# Patient Record
Sex: FEMALE | Race: WHITE | NOT HISPANIC OR LATINO | Employment: OTHER | RURAL
[De-identification: names, ages, dates, MRNs, and addresses within clinical notes are randomized per-mention and may not be internally consistent; named-entity substitution may affect disease eponyms.]

---

## 2020-12-21 ENCOUNTER — HISTORICAL (OUTPATIENT)
Dept: ADMINISTRATIVE | Facility: HOSPITAL | Age: 80
End: 2020-12-21

## 2021-07-20 PROCEDURE — 90853 GROUP PSYCHOTHERAPY: CPT | Mod: PO

## 2021-07-22 PROCEDURE — 90853 GROUP PSYCHOTHERAPY: CPT | Mod: PO

## 2021-07-27 PROCEDURE — 90853 GROUP PSYCHOTHERAPY: CPT | Mod: PO

## 2021-07-29 PROCEDURE — 90853 GROUP PSYCHOTHERAPY: CPT | Mod: PO

## 2021-08-03 PROCEDURE — 90853 GROUP PSYCHOTHERAPY: CPT | Mod: PO

## 2021-08-05 PROCEDURE — 90853 GROUP PSYCHOTHERAPY: CPT | Mod: PO

## 2021-08-10 PROCEDURE — 90853 GROUP PSYCHOTHERAPY: CPT | Mod: PO

## 2021-08-12 PROCEDURE — 90853 GROUP PSYCHOTHERAPY: CPT | Mod: PO

## 2021-08-17 PROCEDURE — 90853 GROUP PSYCHOTHERAPY: CPT | Mod: PO

## 2021-08-19 PROCEDURE — 90853 GROUP PSYCHOTHERAPY: CPT | Mod: PO

## 2021-08-19 PROCEDURE — 90832 PSYTX W PT 30 MINUTES: CPT | Mod: PO

## 2021-08-24 PROCEDURE — 90853 GROUP PSYCHOTHERAPY: CPT | Mod: PO

## 2021-08-26 PROCEDURE — 90853 GROUP PSYCHOTHERAPY: CPT | Mod: PO

## 2021-08-31 PROCEDURE — 90853 GROUP PSYCHOTHERAPY: CPT | Mod: PO

## 2021-09-07 PROCEDURE — 90853 GROUP PSYCHOTHERAPY: CPT | Mod: PO

## 2021-09-09 PROCEDURE — 90853 GROUP PSYCHOTHERAPY: CPT | Mod: PO

## 2021-09-14 PROCEDURE — 90853 GROUP PSYCHOTHERAPY: CPT | Mod: PO

## 2021-09-16 PROCEDURE — 90853 GROUP PSYCHOTHERAPY: CPT | Mod: PO

## 2021-09-21 PROCEDURE — 90832 PSYTX W PT 30 MINUTES: CPT | Mod: PO

## 2021-09-21 PROCEDURE — 90853 GROUP PSYCHOTHERAPY: CPT | Mod: PO

## 2021-09-22 PROCEDURE — 90853 GROUP PSYCHOTHERAPY: CPT | Mod: PO

## 2021-09-28 PROCEDURE — 90853 GROUP PSYCHOTHERAPY: CPT | Mod: PO

## 2021-09-30 PROCEDURE — 90853 GROUP PSYCHOTHERAPY: CPT | Mod: PO

## 2021-10-05 PROCEDURE — 90853 GROUP PSYCHOTHERAPY: CPT | Mod: PO

## 2021-10-07 PROCEDURE — 90853 GROUP PSYCHOTHERAPY: CPT | Mod: PO

## 2021-10-12 PROCEDURE — 90853 GROUP PSYCHOTHERAPY: CPT | Mod: PO

## 2021-10-14 PROCEDURE — 90853 GROUP PSYCHOTHERAPY: CPT | Mod: PO

## 2021-10-19 PROCEDURE — 90853 GROUP PSYCHOTHERAPY: CPT | Mod: PO

## 2021-10-21 PROCEDURE — 90853 GROUP PSYCHOTHERAPY: CPT | Mod: PO

## 2021-10-26 PROCEDURE — 90853 GROUP PSYCHOTHERAPY: CPT | Mod: PO

## 2021-10-28 PROCEDURE — 90853 GROUP PSYCHOTHERAPY: CPT | Mod: PO

## 2021-10-28 PROCEDURE — 90832 PSYTX W PT 30 MINUTES: CPT | Mod: PO,59

## 2021-11-02 PROCEDURE — 90853 GROUP PSYCHOTHERAPY: CPT | Mod: PO

## 2021-11-04 PROCEDURE — 90853 GROUP PSYCHOTHERAPY: CPT | Mod: PO

## 2021-11-09 PROCEDURE — 90853 GROUP PSYCHOTHERAPY: CPT | Mod: PO

## 2021-11-11 PROCEDURE — 90853 GROUP PSYCHOTHERAPY: CPT | Mod: PO

## 2021-11-16 PROCEDURE — 90853 GROUP PSYCHOTHERAPY: CPT | Mod: PO

## 2021-11-23 PROCEDURE — 90853 GROUP PSYCHOTHERAPY: CPT | Mod: PO

## 2021-11-24 PROCEDURE — 90853 GROUP PSYCHOTHERAPY: CPT | Mod: PO

## 2021-11-30 PROCEDURE — 90853 GROUP PSYCHOTHERAPY: CPT | Mod: PO

## 2021-12-02 PROCEDURE — 90853 GROUP PSYCHOTHERAPY: CPT | Mod: PO

## 2021-12-14 PROCEDURE — 90832 PSYTX W PT 30 MINUTES: CPT | Mod: PO

## 2021-12-14 PROCEDURE — 90853 GROUP PSYCHOTHERAPY: CPT | Mod: PO

## 2021-12-16 PROCEDURE — 90853 GROUP PSYCHOTHERAPY: CPT | Mod: PO

## 2021-12-20 PROCEDURE — 90853 GROUP PSYCHOTHERAPY: CPT | Mod: PO

## 2021-12-21 PROCEDURE — 90853 GROUP PSYCHOTHERAPY: CPT | Mod: PO

## 2021-12-27 PROCEDURE — 90853 GROUP PSYCHOTHERAPY: CPT | Mod: PO

## 2021-12-27 PROCEDURE — 90832 PSYTX W PT 30 MINUTES: CPT | Mod: PO

## 2021-12-28 PROCEDURE — 90853 GROUP PSYCHOTHERAPY: CPT | Mod: PO

## 2022-01-04 PROCEDURE — 90853 GROUP PSYCHOTHERAPY: CPT | Mod: PO

## 2022-01-10 ENCOUNTER — HOSPITAL ENCOUNTER (OUTPATIENT)
Dept: RADIOLOGY | Facility: HOSPITAL | Age: 82
Discharge: HOME | End: 2022-01-10
Payer: MEDICARE

## 2022-01-10 VITALS — WEIGHT: 160 LBS | BODY MASS INDEX: 28.35 KG/M2 | HEIGHT: 63 IN

## 2022-01-10 DIAGNOSIS — Z12.31 VISIT FOR SCREENING MAMMOGRAM: ICD-10-CM

## 2022-01-10 PROCEDURE — 77067 SCR MAMMO BI INCL CAD: CPT | Mod: TC

## 2022-01-10 PROCEDURE — 77067 SCR MAMMO BI INCL CAD: CPT | Mod: 26,,, | Performed by: RADIOLOGY

## 2022-01-10 PROCEDURE — 77067 MAMMO DIGITAL SCREENING BILAT: ICD-10-PCS | Mod: 26,,, | Performed by: RADIOLOGY

## 2022-01-14 PROCEDURE — 90853 GROUP PSYCHOTHERAPY: CPT | Mod: PO

## 2022-03-08 PROCEDURE — 90853 GROUP PSYCHOTHERAPY: CPT | Mod: PO

## 2022-03-10 PROCEDURE — 90853 GROUP PSYCHOTHERAPY: CPT | Mod: PO

## 2022-03-14 PROCEDURE — 90832 PSYTX W PT 30 MINUTES: CPT | Mod: PO

## 2022-03-14 PROCEDURE — 90853 GROUP PSYCHOTHERAPY: CPT | Mod: PO

## 2022-03-17 PROCEDURE — 90853 GROUP PSYCHOTHERAPY: CPT | Mod: PO

## 2022-03-21 PROCEDURE — 90853 GROUP PSYCHOTHERAPY: CPT | Mod: PO

## 2022-03-22 PROCEDURE — 90853 GROUP PSYCHOTHERAPY: CPT | Mod: PO

## 2022-03-24 PROCEDURE — 90853 GROUP PSYCHOTHERAPY: CPT | Mod: PO

## 2022-03-28 PROCEDURE — 90853 GROUP PSYCHOTHERAPY: CPT | Mod: PO

## 2022-03-29 PROCEDURE — 90853 GROUP PSYCHOTHERAPY: CPT | Mod: PO

## 2022-03-31 PROCEDURE — 90853 GROUP PSYCHOTHERAPY: CPT | Mod: PO

## 2022-04-04 PROCEDURE — 90853 GROUP PSYCHOTHERAPY: CPT | Mod: PO

## 2022-04-07 PROCEDURE — 90853 GROUP PSYCHOTHERAPY: CPT | Mod: PO

## 2022-04-08 PROCEDURE — 90853 GROUP PSYCHOTHERAPY: CPT | Mod: PO

## 2022-04-08 PROCEDURE — 90832 PSYTX W PT 30 MINUTES: CPT | Mod: PO

## 2022-04-11 PROCEDURE — 90853 GROUP PSYCHOTHERAPY: CPT | Mod: PO

## 2022-04-12 PROCEDURE — 90853 GROUP PSYCHOTHERAPY: CPT | Mod: PO

## 2022-04-14 PROCEDURE — 90853 GROUP PSYCHOTHERAPY: CPT | Mod: PO

## 2022-04-15 ENCOUNTER — OUTSIDE PLACE OF SERVICE (OUTPATIENT)
Dept: ADMINISTRATIVE | Facility: HOSPITAL | Age: 82
End: 2022-04-15
Payer: MEDICARE

## 2022-04-16 PROCEDURE — 99232 SBSQ HOSP IP/OBS MODERATE 35: CPT | Mod: 57,,, | Performed by: ORTHOPAEDIC SURGERY

## 2022-04-16 PROCEDURE — 99232 PR SUBSEQUENT HOSPITAL CARE,LEVL II: ICD-10-PCS | Mod: 57,,, | Performed by: ORTHOPAEDIC SURGERY

## 2022-04-16 PROCEDURE — 27245 PR OPEN FIX INTER/SUBTROCH FX,IMPLNT: ICD-10-PCS | Mod: RT,,, | Performed by: ORTHOPAEDIC SURGERY

## 2022-04-16 PROCEDURE — 27245 TREAT THIGH FRACTURE: CPT | Mod: RT,,, | Performed by: ORTHOPAEDIC SURGERY

## 2022-05-16 DIAGNOSIS — S72.142D CLOSED DISPLACED INTERTROCHANTERIC FRACTURE OF LEFT FEMUR WITH ROUTINE HEALING, SUBSEQUENT ENCOUNTER: Primary | ICD-10-CM

## 2023-01-26 ENCOUNTER — HOSPITAL ENCOUNTER (OUTPATIENT)
Dept: RADIOLOGY | Facility: HOSPITAL | Age: 83
Discharge: HOME | End: 2023-01-26
Attending: RADIOLOGY
Payer: MEDICARE

## 2023-01-26 DIAGNOSIS — Z12.31 VISIT FOR SCREENING MAMMOGRAM: ICD-10-CM

## 2023-01-26 PROCEDURE — 77067 SCR MAMMO BI INCL CAD: CPT | Mod: TC

## 2023-01-26 PROCEDURE — 77067 SCR MAMMO BI INCL CAD: CPT | Mod: 26,GW,ICN, | Performed by: RADIOLOGY

## 2023-01-26 PROCEDURE — 77067 MAMMO DIGITAL SCREENING BILAT: ICD-10-PCS | Mod: 26,GW,ICN, | Performed by: RADIOLOGY

## 2023-07-14 PROCEDURE — 99222 PR INITIAL HOSPITAL CARE,LEVL II: ICD-10-PCS | Mod: 57,,, | Performed by: ORTHOPAEDIC SURGERY

## 2023-07-14 PROCEDURE — 99222 1ST HOSP IP/OBS MODERATE 55: CPT | Mod: 57,,, | Performed by: ORTHOPAEDIC SURGERY

## 2023-07-15 ENCOUNTER — OUTSIDE PLACE OF SERVICE (OUTPATIENT)
Dept: ORTHOPEDICS | Facility: CLINIC | Age: 83
End: 2023-07-15
Payer: MEDICARE

## 2023-07-15 PROCEDURE — 26735 TREAT FINGER FRACTURE EACH: CPT | Mod: RT,,, | Performed by: ORTHOPAEDIC SURGERY

## 2023-07-15 PROCEDURE — 23605 PR CLOSED RX PROX HUMERUS FX,MANIP: ICD-10-PCS | Mod: 51,LT,, | Performed by: ORTHOPAEDIC SURGERY

## 2023-07-15 PROCEDURE — 26735 PR OPEN TX PHALANGEAL SHAFT FRACTURE PROX/MIDDLE EA: ICD-10-PCS | Mod: RT,,, | Performed by: ORTHOPAEDIC SURGERY

## 2023-07-15 PROCEDURE — 23605 CLTX PRX HMRL FX MNPJ+-TRACT: CPT | Mod: 51,LT,, | Performed by: ORTHOPAEDIC SURGERY

## 2023-07-21 DIAGNOSIS — M25.512 LEFT SHOULDER PAIN, UNSPECIFIED CHRONICITY: Primary | ICD-10-CM

## 2023-07-24 ENCOUNTER — OFFICE VISIT (OUTPATIENT)
Dept: ORTHOPEDICS | Facility: CLINIC | Age: 83
End: 2023-07-24
Payer: COMMERCIAL

## 2023-07-24 ENCOUNTER — HOSPITAL ENCOUNTER (OUTPATIENT)
Dept: RADIOLOGY | Facility: HOSPITAL | Age: 83
Discharge: HOME | End: 2023-07-24
Attending: ORTHOPAEDIC SURGERY
Payer: COMMERCIAL

## 2023-07-24 DIAGNOSIS — M25.512 LEFT SHOULDER PAIN, UNSPECIFIED CHRONICITY: ICD-10-CM

## 2023-07-24 DIAGNOSIS — Z47.89 ORTHOPEDIC AFTERCARE: Primary | ICD-10-CM

## 2023-07-24 DIAGNOSIS — Z47.89 ORTHOPEDIC AFTERCARE: ICD-10-CM

## 2023-07-24 PROCEDURE — 73130 X-RAY EXAM OF HAND: CPT | Mod: 26,RT,, | Performed by: ORTHOPAEDIC SURGERY

## 2023-07-24 PROCEDURE — 73110 XR WRIST COMPLETE 3 VIEWS RIGHT: ICD-10-PCS | Mod: 26,RT,, | Performed by: ORTHOPAEDIC SURGERY

## 2023-07-24 PROCEDURE — 99024 POSTOP FOLLOW-UP VISIT: CPT | Mod: ,,, | Performed by: ORTHOPAEDIC SURGERY

## 2023-07-24 PROCEDURE — 99213 OFFICE O/P EST LOW 20 MIN: CPT | Mod: PBBFAC | Performed by: ORTHOPAEDIC SURGERY

## 2023-07-24 PROCEDURE — 73110 X-RAY EXAM OF WRIST: CPT | Mod: TC,RT

## 2023-07-24 PROCEDURE — 73130 X-RAY EXAM OF HAND: CPT | Mod: TC,RT

## 2023-07-24 PROCEDURE — 73030 XR SHOULDER COMPLETE 2 OR MORE VIEWS LEFT: ICD-10-PCS | Mod: 26,LT,, | Performed by: ORTHOPAEDIC SURGERY

## 2023-07-24 PROCEDURE — 99024 PR POST-OP FOLLOW-UP VISIT: ICD-10-PCS | Mod: ,,, | Performed by: ORTHOPAEDIC SURGERY

## 2023-07-24 PROCEDURE — 73030 X-RAY EXAM OF SHOULDER: CPT | Mod: TC,LT

## 2023-07-24 PROCEDURE — 73110 X-RAY EXAM OF WRIST: CPT | Mod: 26,RT,, | Performed by: ORTHOPAEDIC SURGERY

## 2023-07-24 PROCEDURE — 73030 X-RAY EXAM OF SHOULDER: CPT | Mod: 26,LT,, | Performed by: ORTHOPAEDIC SURGERY

## 2023-07-24 PROCEDURE — 73130 XR HAND COMPLETE 3 VIEW RIGHT: ICD-10-PCS | Mod: 26,RT,, | Performed by: ORTHOPAEDIC SURGERY

## 2023-07-24 RX ORDER — PROPRANOLOL HYDROCHLORIDE 60 MG/1
60 CAPSULE, EXTENDED RELEASE ORAL NIGHTLY
COMMUNITY
Start: 2023-07-14

## 2023-07-24 RX ORDER — MEMANTINE HYDROCHLORIDE 10 MG/1
10 TABLET ORAL 2 TIMES DAILY
COMMUNITY
Start: 2023-07-14

## 2023-07-24 RX ORDER — METOPROLOL TARTRATE 50 MG/1
TABLET ORAL
COMMUNITY
Start: 2022-09-30

## 2023-07-24 RX ORDER — GLIPIZIDE 10 MG/1
TABLET ORAL
COMMUNITY
Start: 2022-11-04

## 2023-07-24 RX ORDER — AMITRIPTYLINE HYDROCHLORIDE 10 MG/1
10 TABLET, FILM COATED ORAL NIGHTLY
COMMUNITY
Start: 2023-07-07

## 2023-07-24 RX ORDER — MONTELUKAST SODIUM 10 MG/1
10 TABLET ORAL
COMMUNITY
Start: 2023-06-16

## 2023-07-24 RX ORDER — GABAPENTIN 100 MG/1
CAPSULE ORAL
COMMUNITY
Start: 2023-02-09

## 2023-07-24 RX ORDER — INSULIN DETEMIR 100 [IU]/ML
INJECTION, SOLUTION SUBCUTANEOUS
COMMUNITY
Start: 2023-07-07

## 2023-07-24 RX ORDER — DONEPEZIL HYDROCHLORIDE 10 MG/1
TABLET, FILM COATED ORAL
COMMUNITY

## 2023-07-24 RX ORDER — CITALOPRAM 20 MG/1
TABLET, FILM COATED ORAL
COMMUNITY

## 2023-07-24 NOTE — PROGRESS NOTES
CLINIC NOTE       Chief Complaint   Patient presents with    Left Shoulder - Pain, Injury    Right Hand - Post-op Evaluation        Jojo Dean is a 83 y.o. female seen today for recheck of left shoulder and right ring finger injuries.  She sustained a comminuted minimally displaced three-part fracture of the left shoulder and an oblique comminuted displaced fracture of the proximal phalanx to the right ring finger 07/14/2023 as result of a fall at home.  She underwent ORIF of the right ring finger proximal phalanx fracture performed by myself the next day and closed treatment of the left proximal humerus fracture with sling and swath.  She is presently recovering of the [facility] rehab facility.      Past Medical History:   Diagnosis Date    Breast cancer      Family History   Problem Relation Age of Onset    Breast cancer Sister      Current Outpatient Medications on File Prior to Visit   Medication Sig Dispense Refill    glipiZIDE (GLUCOTROL) 10 MG tablet glipiZIDE 10 MG Oral Tablet QTY: 360 tablet Days: 90 Refills: 2  Written: 11/04/22 Patient Instructions: 2 twice a day      metoprolol tartrate (LOPRESSOR) 50 MG tablet Metoprolol Tartrate 50 MG Oral Tablet QTY: 60 tablet Days: 30 Refills: 0  Written: 09/30/22 Patient Instructions:      amitriptyline (ELAVIL) 10 MG tablet Take 10 mg by mouth every evening.      citalopram (CELEXA) 20 MG tablet 1 tablet Orally Once a day      donepeziL (ARICEPT) 10 MG tablet 1 tablet at bedtime Orally Once a day for 30 day(s)      gabapentin (NEURONTIN) 100 MG capsule TAKE ONE CAPSULE AT 4:00PM AND ONE CAPSULE AT 9:00PM      LEVEMIR FLEXPEN 100 unit/mL (3 mL) InPn pen INJECT 35 UNITS EVERY MORNING AND 30 UNITS EVERY P.M. AS DIRECTED      memantine (NAMENDA) 10 MG Tab Take 10 mg by mouth 2 (two) times daily.      montelukast (SINGULAIR) 10 mg tablet Take 10 mg by mouth.      propranoloL (INDERAL LA) 60 MG 24 hr capsule Take 60 mg by mouth every evening.       No current  facility-administered medications on file prior to visit.       ROS     There were no vitals filed for this visit.    Past Surgical History:   Procedure Laterality Date    BREAST BIOPSY      BREAST RECONSTRUCTION      HYSTERECTOMY      MASTECTOMY      OOPHORECTOMY          Review of patient's allergies indicates:  Not on File     Ortho Exam :  Well-developed well-nourished  female no acute distress.  Exam left hand shows healing incision over the ring finger proximal phalangeal segment.  Right shoulder has a normal contour.    Radiographic Examination:  Left hand 07/24/2023    Technique:  Three views AP lateral oblique    Findings:  The bones well mineralized.  Carpus normally aligned.  There is a fracture involving the proximal phalanx of the ring finger in near anatomic position alignment.  The fracture spanned by a dorsal plate and screws.    Impression:   See Above  Radiographic examination:  Left wrist 07/24/2023  Technique:  Three views AP lateral and oblique  Findings:  The bones well mineralized.  Carpus normally aligned.  There is a fracture involving the proximal phalanx of the ring finger.  The fracture is in near anatomic position and alignment and spanned by a dorsal plate and screws.    Impression: See above  Radiographic examination:  Left shoulder 07/24/2023  Technique two views AP and lateral scapular  Findings:  The bones are well mineralized.  Glenohumeral joint is located.  There is a minimally displaced three-part fracture of the proximal humerus remaining in good position alignment.  Assessment and Plan  There are no problems to display for this patient.   Impression: 1.Healing fractures Right ring finger and   Left shoulder.  Plan:  Sutures removed from right ring finger and Steri-Strips applied.  Continue shoulder sling and swath.  Alfa tape applied between ring and long fingers.  Recheck 2 weeks    Saran Wiseman M.D.

## 2024-10-16 ENCOUNTER — HOSPITAL ENCOUNTER (EMERGENCY)
Facility: HOSPITAL | Age: 84
Discharge: HOME | End: 2024-10-16
Attending: FAMILY MEDICINE
Payer: MEDICARE

## 2024-10-16 VITALS
RESPIRATION RATE: 20 BRPM | SYSTOLIC BLOOD PRESSURE: 196 MMHG | HEART RATE: 99 BPM | HEIGHT: 65 IN | DIASTOLIC BLOOD PRESSURE: 90 MMHG | OXYGEN SATURATION: 98 % | TEMPERATURE: 98 F | WEIGHT: 150 LBS | BODY MASS INDEX: 24.99 KG/M2

## 2024-10-16 DIAGNOSIS — S01.01XA LACERATION OF SCALP, INITIAL ENCOUNTER: ICD-10-CM
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
## 2024-10-16 LAB
ALBUMIN SERPL BCP-MCNC: 3.5 G/DL (ref 3.5–5)
ALBUMIN/GLOB SERPL: 1.3 {RATIO}
ALP SERPL-CCNC: 74 U/L (ref 55–142)
ALT SERPL W P-5'-P-CCNC: 19 U/L (ref 13–56)
ANION GAP SERPL CALCULATED.3IONS-SCNC: 8 MMOL/L (ref 7–16)
AST SERPL W P-5'-P-CCNC: 18 U/L (ref 15–37)
BACTERIA #/AREA URNS HPF: ABNORMAL /HPF
BASOPHILS # BLD AUTO: 0.04 K/UL (ref 0–0.2)
BASOPHILS NFR BLD AUTO: 0.3 % (ref 0–1)
BILIRUB SERPL-MCNC: 0.4 MG/DL (ref ?–1.2)
BILIRUB UR QL STRIP: NEGATIVE
BUN SERPL-MCNC: 26 MG/DL (ref 7–18)
BUN/CREAT SERPL: 25 (ref 6–20)
CALCIUM SERPL-MCNC: 9.1 MG/DL (ref 8.5–10.1)
CHLORIDE SERPL-SCNC: 109 MMOL/L (ref 98–107)
CLARITY UR: CLEAR
CO2 SERPL-SCNC: 27 MMOL/L (ref 21–32)
COLOR UR: COLORLESS
CREAT SERPL-MCNC: 1.06 MG/DL (ref 0.55–1.02)
DIFFERENTIAL METHOD BLD: ABNORMAL
EGFR (NO RACE VARIABLE) (RUSH/TITUS): 53 ML/MIN/1.73M2
EOSINOPHIL # BLD AUTO: 0.06 K/UL (ref 0–0.5)
EOSINOPHIL NFR BLD AUTO: 0.5 % (ref 1–4)
ERYTHROCYTE [DISTWIDTH] IN BLOOD BY AUTOMATED COUNT: 13.3 % (ref 11.5–14.5)
GLOBULIN SER-MCNC: 2.8 G/DL (ref 2–4)
GLUCOSE SERPL-MCNC: 121 MG/DL (ref 70–105)
GLUCOSE SERPL-MCNC: 159 MG/DL (ref 74–106)
GLUCOSE SERPL-MCNC: 72 MG/DL (ref 70–105)
GLUCOSE SERPL-MCNC: 73 MG/DL (ref 70–105)
GLUCOSE UR STRIP-MCNC: 150 MG/DL
HCT VFR BLD AUTO: 40 % (ref 38–47)
HGB BLD-MCNC: 12.7 G/DL (ref 12–16)
IMM GRANULOCYTES # BLD AUTO: 0.16 K/UL (ref 0–0.04)
IMM GRANULOCYTES NFR BLD: 1.3 % (ref 0–0.4)
KETONES UR STRIP-SCNC: 10 MG/DL
LEUKOCYTE ESTERASE UR QL STRIP: ABNORMAL
LYMPHOCYTES # BLD AUTO: 1.1 K/UL (ref 1–4.8)
LYMPHOCYTES NFR BLD AUTO: 8.7 % (ref 27–41)
MCH RBC QN AUTO: 30.8 PG (ref 27–31)
MCHC RBC AUTO-ENTMCNC: 31.8 G/DL (ref 32–36)
MCV RBC AUTO: 97.1 FL (ref 80–96)
MONOCYTES # BLD AUTO: 0.77 K/UL (ref 0–0.8)
MONOCYTES NFR BLD AUTO: 6.1 % (ref 2–6)
MPC BLD CALC-MCNC: 10.9 FL (ref 9.4–12.4)
NEUTROPHILS # BLD AUTO: 10.45 K/UL (ref 1.8–7.7)
NEUTROPHILS NFR BLD AUTO: 83.1 % (ref 53–65)
NITRITE UR QL STRIP: NEGATIVE
NRBC # BLD AUTO: 0 X10E3/UL
NRBC, AUTO (.00): 0 %
PH UR STRIP: 6 PH UNITS
PLATELET # BLD AUTO: 314 K/UL (ref 150–400)
POTASSIUM SERPL-SCNC: 3.8 MMOL/L (ref 3.5–5.1)
PROT SERPL-MCNC: 6.3 G/DL (ref 6.4–8.2)
PROT UR QL STRIP: NEGATIVE
RBC # BLD AUTO: 4.12 M/UL (ref 4.2–5.4)
RBC # UR STRIP: NEGATIVE /UL
RBC #/AREA URNS HPF: 1 /HPF
SODIUM SERPL-SCNC: 140 MMOL/L (ref 136–145)
SP GR UR STRIP: 1.01
UROBILINOGEN UR STRIP-ACNC: NORMAL MG/DL
WBC # BLD AUTO: 12.58 K/UL (ref 4.5–11)
WBC #/AREA URNS HPF: 16 /HPF

## 2024-10-16 PROCEDURE — 36415 COLL VENOUS BLD VENIPUNCTURE: CPT | Performed by: FAMILY MEDICINE

## 2024-10-16 PROCEDURE — 82962 GLUCOSE BLOOD TEST: CPT

## 2024-10-16 PROCEDURE — 96374 THER/PROPH/DIAG INJ IV PUSH: CPT | Mod: 59

## 2024-10-16 PROCEDURE — 80053 COMPREHEN METABOLIC PANEL: CPT | Performed by: FAMILY MEDICINE

## 2024-10-16 PROCEDURE — 63600175 PHARM REV CODE 636 W HCPCS: Performed by: EMERGENCY MEDICINE

## 2024-10-16 PROCEDURE — 99285 EMERGENCY DEPT VISIT HI MDM: CPT | Mod: 25

## 2024-10-16 PROCEDURE — 85025 COMPLETE CBC W/AUTO DIFF WBC: CPT | Performed by: FAMILY MEDICINE

## 2024-10-16 PROCEDURE — 87086 URINE CULTURE/COLONY COUNT: CPT | Performed by: FAMILY MEDICINE

## 2024-10-16 PROCEDURE — 12001 RPR S/N/AX/GEN/TRNK 2.5CM/<: CPT

## 2024-10-16 PROCEDURE — 81003 URINALYSIS AUTO W/O SCOPE: CPT | Performed by: FAMILY MEDICINE

## 2024-10-16 PROCEDURE — 81001 URINALYSIS AUTO W/SCOPE: CPT | Performed by: FAMILY MEDICINE

## 2024-10-16 RX ORDER — LORAZEPAM 2 MG/ML
0.5 INJECTION INTRAMUSCULAR
Status: COMPLETED | OUTPATIENT
Start: 2024-10-16 | End: 2024-10-16

## 2024-10-16 RX ADMIN — LORAZEPAM 0.5 MG: 2 INJECTION INTRAMUSCULAR; INTRAVENOUS at 08:10

## 2024-10-16 NOTE — ED PROVIDER NOTES
Encounter Date: 10/16/2024    SCRIBE #1 NOTE: I, Nancy Murray, am scribing for, and in the presence of,  Ko Ness DO. I have scribed the entire note.       History     Chief Complaint   Patient presents with    Fall     Unwitnessed fall presenting by [facility] that occurred just PTA at [facility]. Patient actively bleeding upon arrival.      84 y.o.female presented to the ED via EMS from home for fall. She has 3 skin tears on her left arm and has 2 2.5 cm laceration on the right side of her head located on the side. It is unknown if she had loss of conscious. She has HX of smoking and medical HX of Breast cancer.     The history is provided by the EMS personnel. No  was used.     Review of patient's allergies indicates:   Allergen Reactions    Codeine     Oxycodone     Penicillin     Sulfa (sulfonamide antibiotics)      Past Medical History:   Diagnosis Date    Breast cancer      Past Surgical History:   Procedure Laterality Date    BREAST BIOPSY      BREAST RECONSTRUCTION      HYSTERECTOMY      MASTECTOMY      OOPHORECTOMY       Family History   Problem Relation Name Age of Onset    Breast cancer Sister       Social History     Tobacco Use    Smoking status: Former     Types: Cigarettes    Smokeless tobacco: Never   Substance Use Topics    Alcohol use: Not Currently     Review of Systems   Constitutional: Negative.  Negative for fever.   HENT: Negative.  Negative for sore throat.    Eyes: Negative.    Respiratory: Negative.  Negative for shortness of breath.    Cardiovascular: Negative.  Negative for chest pain.   Gastrointestinal: Negative.  Negative for nausea.   Endocrine: Negative.    Genitourinary: Negative.  Negative for dysuria.   Musculoskeletal: Negative.  Negative for back pain.   Skin:  Positive for wound (2.5 laceration on the right side of the head.). Negative for rash.   Allergic/Immunologic: Negative.    Neurological: Negative.  Negative for weakness.    Hematological: Negative.  Does not bruise/bleed easily.   Psychiatric/Behavioral: Negative.     All other systems reviewed and are negative.      Physical Exam     Initial Vitals   BP Pulse Resp Temp SpO2   10/16/24 1816 10/16/24 1811 10/16/24 1811 10/16/24 1820 10/16/24 1811   (!) 196/90 70 16 97.6 °F (36.4 °C) 95 %      MAP       --                Physical Exam    Constitutional: She appears well-developed and well-nourished.   HENT:   Head: Normocephalic and atraumatic.   Right Ear: External ear normal.   Left Ear: External ear normal.   Nose: Nose normal. Mouth/Throat: Oropharynx is clear and moist.   Eyes: Conjunctivae and EOM are normal. Pupils are equal, round, and reactive to light.   Neck: Neck supple.   Normal range of motion.  Cardiovascular:  Normal rate, regular rhythm, normal heart sounds and intact distal pulses.           Pulmonary/Chest: Breath sounds normal.   Abdominal: Abdomen is soft. Bowel sounds are normal.   Genitourinary:    Vagina and uterus normal.     Musculoskeletal:         General: Normal range of motion.      Cervical back: Normal range of motion and neck supple.     Neurological: She is alert and oriented to person, place, and time. She has normal strength and normal reflexes.   Skin: Skin is warm. Capillary refill takes less than 2 seconds.   2.5 laceration on the right side of head    Psychiatric: She has a normal mood and affect. Her behavior is normal. Judgment and thought content normal.         ED Course   Lac Repair    Date/Time: 10/16/2024 5:38 PM    Performed by: Ko Ness DO  Authorized by: Ko Ness DO    Consent:     Consent obtained:  Verbal    Consent given by:  Patient  Universal protocol:     Patient identity confirmed:  Hospital-assigned identification number and verbally with patient  Anesthesia:     Anesthesia method:  Local infiltration    Local anesthetic:  Lidocaine 1% w/o epi  Laceration details:     Location: side of the head.    Length  (cm):  2.5  Pre-procedure details:     Preparation:  Patient was prepped and draped in usual sterile fashion  Treatment:     Area cleansed with:  Saline    Amount of cleaning:  Standard  Skin repair:     Repair method:  Staples    Number of staples:  11  Approximation:     Approximation:  Close  Repair type:     Repair type:  Simple  Post-procedure details:     Dressing:  Sterile dressing    Procedure completion:  Tolerated    Labs Reviewed   COMPREHENSIVE METABOLIC PANEL - Abnormal       Result Value    Sodium 140      Potassium 3.8      Chloride 109 (*)     CO2 27      Anion Gap 8      Glucose 159 (*)     BUN 26 (*)     Creatinine 1.06 (*)     BUN/Creatinine Ratio 25 (*)     Calcium 9.1      Total Protein 6.3 (*)     Albumin 3.5      Globulin 2.8      A/G Ratio 1.3      Bilirubin, Total 0.4      Alk Phos 74      ALT 19      AST 18      eGFR 53 (*)    CBC WITH DIFFERENTIAL - Abnormal    WBC 12.58 (*)     RBC 4.12 (*)     Hemoglobin 12.7      Hematocrit 40.0      MCV 97.1 (*)     MCH 30.8      MCHC 31.8 (*)     RDW 13.3      Platelet Count 314      MPV 10.9      Neutrophils % 83.1 (*)     Lymphocytes % 8.7 (*)     Monocytes % 6.1 (*)     Eosinophils % 0.5 (*)     Basophils % 0.3      Immature Granulocytes % 1.3 (*)     nRBC, Auto 0.0      Neutrophils, Abs 10.45 (*)     Lymphocytes, Absolute 1.10      Monocytes, Absolute 0.77      Eosinophils, Absolute 0.06      Basophils, Absolute 0.04      Immature Granulocytes, Absolute 0.16 (*)     nRBC, Absolute 0.00      Diff Type Auto     URINALYSIS, REFLEX TO URINE CULTURE - Abnormal    Color, UA Colorless      Clarity, UA Clear      pH, UA 6.0      Leukocytes, UA Large (*)     Nitrites, UA Negative      Protein, UA Negative      Glucose,  (*)     Ketones, UA 10 (*)     Urobilinogen, UA Normal      Bilirubin, UA Negative      Blood, UA Negative      Specific [place], UA 1.011     URINALYSIS, MICROSCOPIC - Abnormal    WBC, UA 16 (*)     RBC, UA 1      Bacteria, UA  Occasional (*)    POCT GLUCOSE MONITORING CONTINUOUS - Abnormal    POC Glucose 121 (*)    CULTURE, URINE    Culture, Urine No Growth To Date     CBC W/ AUTO DIFFERENTIAL    Narrative:     The following orders were created for panel order CBC auto differential.  Procedure                               Abnormality         Status                     ---------                               -----------         ------                     CBC with Differential[[id number]]       Abnormal            Final result                 Please view results for these tests on the individual orders.   POCT GLUCOSE MONITORING CONTINUOUS    POC Glucose 72     POCT GLUCOSE MONITORING CONTINUOUS    POC Glucose 73            Imaging Results              CT Cervical Spine Without Contrast (Final result)  Result time 10/16/24 18:33:41      Final result by Joel Garcia MD (10/16/24 18:33:41)                   Impression:      1. No acute abnormality  2. Multilevel chronic degenerative changes.      Electronically signed by: Joel Garcia  Date:    10/16/2024  Time:    18:33               Narrative:    EXAMINATION:  CT CERVICAL SPINE WITHOUT CONTRAST    CLINICAL HISTORY:  Neck trauma (Age >= 65y);    TECHNIQUE:  Low dose oblique axial CT images through the cervical spine, with sagittal and oblique coronal reformations.  Contrast was not administered.    COMPARISON:  None    FINDINGS:  No acute fracture of the cervical spine.  Minimal spondylolisthesis of C3 on C4, C4 on C5, C5 on C6 and C6 on C7.    Moderate posterior disc osteophyte complex at C6-7 and to a slightly lesser degree C5-6.    Bilateral multilevel facet arthropathy.    Mild central canal narrowing at C6-7.    Severe foraminal narrowing at C3-4 bilaterally, right greater than left.    Moderate bilateral foraminal narrowing elsewhere.    Limited evaluation of the intraspinal contents demonstrates no hematoma or mass.Paraspinal soft tissues exhibit no acute abnormalities.                                        CT Head Without Contrast (Final result)  Result time 10/16/24 18:19:55      Final result by Joel Garcia MD (10/16/24 18:19:55)                   Impression:      1. No acute intracranial process.  2. Involutional changes with chronic microvascular ischemic changes.      Electronically signed by: Joel Garcia  Date:    10/16/2024  Time:    18:19               Narrative:    EXAMINATION:  CT HEAD WITHOUT CONTRAST    CLINICAL HISTORY:  Syncope, simple, normal neuro exam;    TECHNIQUE:  Low dose axial CT images obtained throughout the head without intravenous contrast. Sagittal and coronal reconstructions were performed.    COMPARISON:  None.    FINDINGS:  Intracranial compartment:    No midline shift or acute hydrocephalus.  No extra-axial blood or fluid collections.    Mild prominence of the ventricles and overlying sulci likely associated with involutional changes and chronic microvascular ischemic changes in the periventricular white matter.    No parenchymal mass, hemorrhage, edema or major vascular distribution infarct.    Scalp hematoma posteriorly on the left.  Scalp staples are noted.  No calvarial fracture.    Skull/extracranial contents (limited evaluation): No fracture. Mastoid air cells and paranasal sinuses are essentially clear.                                       Medications   LORazepam injection 0.5 mg (0.5 mg Intravenous Given 10/16/24 2023)     Medical Decision Making            Attending Attestation:           Physician Attestation for Scribe:  Physician Attestation Statement for Scribe #1: I, Ko Ness DO, reviewed documentation, as scribed by Nancy Murray in my presence, and it is both accurate and complete.                        Medical Decision Making:   Initial Assessment:   84 y.o.female presented to the ED via EMS from home for fall. She has 3 skin tears on her left arm and has 2 2.5 cm laceration on the right side of her head located on  the side. It is unknown if she had loss of conscious. She has HX of smoking and medical HX of Breast cancer.     The history is provided by the EMS personnel. No  was used.     Differential Diagnosis:   2-1/2 cm laceration to the back of the scalp on the left.  Over the occipital area  ED Management:  PROCEDURE NOTE----AFTER CLEANING THE HAIR FROM THE WOUND 11 STAPLES WERE PLACED WITH NO COMPLICATIONS BLEEDING CONTROLLED.  HEMATOMA WAS CLEANED OUT OF THE AREA OF THE SCALP.  THERE WAS NO FRACTURE OF THE SKULL NOTED--STENT WAS IRRIGATED WITH NORMAL SALINE AT ANESTHESIA WAS USED [facility] 5 CC             Clinical Impression:  Final diagnoses:  [W19.XXXA] Fall, initial encounter (Primary)  [S01.01XA] Laceration of scalp, initial encounter          ED Disposition Condition    Discharge Stable          ED Prescriptions    None       Follow-up Information    None          Ko Ness,   10/17/24 1450

## 2024-10-17 NOTE — ED NOTES
[facility] Requested  Confirmation #: [id number]  Submission Date / Time: Oct 16, 2024 8:35 PM

## 2024-10-18 LAB — UA COMPLETE W REFLEX CULTURE PNL UR: NO GROWTH

## 2024-11-04 LAB
OHS QRS DURATION: 150 MS
OHS QRS DURATION: 152 MS
OHS QTC CALCULATION: 451 MS
OHS QTC CALCULATION: 466 MS

## 2025-05-08 RX ORDER — LORAZEPAM 0.5 MG/1
TABLET ORAL
Qty: 60 TABLET | Refills: 4 | OUTPATIENT
Start: 2025-05-08